# Patient Record
Sex: MALE | NOT HISPANIC OR LATINO | Employment: FULL TIME | URBAN - METROPOLITAN AREA
[De-identification: names, ages, dates, MRNs, and addresses within clinical notes are randomized per-mention and may not be internally consistent; named-entity substitution may affect disease eponyms.]

---

## 2023-04-06 PROBLEM — G47.00 INSOMNIA: Status: ACTIVE | Noted: 2023-04-06

## 2023-04-06 PROBLEM — R03.0 ELEVATED BLOOD PRESSURE READING: Status: ACTIVE | Noted: 2023-04-06

## 2023-04-06 PROBLEM — F41.9 ANXIETY: Status: ACTIVE | Noted: 2023-04-06

## 2023-04-06 PROBLEM — R42 LIGHT HEADEDNESS: Status: ACTIVE | Noted: 2023-04-06

## 2023-04-06 PROBLEM — R00.2 PALPITATIONS: Status: ACTIVE | Noted: 2023-04-06

## 2023-04-06 RX ORDER — ESCITALOPRAM OXALATE 10 MG/1
1 TABLET ORAL DAILY
COMMUNITY
Start: 2021-03-15 | End: 2023-04-07 | Stop reason: SDUPTHER

## 2023-04-07 ENCOUNTER — OFFICE VISIT (OUTPATIENT)
Dept: PRIMARY CARE | Facility: CLINIC | Age: 28
End: 2023-04-07
Payer: COMMERCIAL

## 2023-04-07 VITALS
SYSTOLIC BLOOD PRESSURE: 130 MMHG | HEART RATE: 81 BPM | WEIGHT: 305 LBS | BODY MASS INDEX: 45.18 KG/M2 | HEIGHT: 69 IN | DIASTOLIC BLOOD PRESSURE: 80 MMHG

## 2023-04-07 DIAGNOSIS — F41.9 ANXIETY: Primary | ICD-10-CM

## 2023-04-07 PROCEDURE — 99213 OFFICE O/P EST LOW 20 MIN: CPT | Performed by: STUDENT IN AN ORGANIZED HEALTH CARE EDUCATION/TRAINING PROGRAM

## 2023-04-07 PROCEDURE — 1036F TOBACCO NON-USER: CPT | Performed by: STUDENT IN AN ORGANIZED HEALTH CARE EDUCATION/TRAINING PROGRAM

## 2023-04-07 RX ORDER — ESCITALOPRAM OXALATE 10 MG/1
10 TABLET ORAL DAILY
Qty: 90 TABLET | Refills: 1 | Status: SHIPPED | OUTPATIENT
Start: 2023-04-07 | End: 2023-10-12

## 2023-04-07 ASSESSMENT — PATIENT HEALTH QUESTIONNAIRE - PHQ9
1. LITTLE INTEREST OR PLEASURE IN DOING THINGS: NOT AT ALL
SUM OF ALL RESPONSES TO PHQ9 QUESTIONS 1 AND 2: 0
2. FEELING DOWN, DEPRESSED OR HOPELESS: NOT AT ALL

## 2023-04-07 NOTE — PROGRESS NOTES
"Subjective   Patient ID: Neptali Gómez is a 28 y.o. male who presents for Med Refill.  Today he is accompanied by alone.     HPI  Neptali is presenting to the office today to follow-up for his anxiety    States that he continues his Lexapro 10 mg daily as indicated the chart    Denies any highs/lows or any major panic attacks    States that he has been following up with a therapist now for the past 3 months and is doing much better  Continues to feel well with this medication and does not wish to change his dosage  Requesting refills to be sent to his mail out pharmacy    Current Outpatient Medications on File Prior to Visit   Medication Sig Dispense Refill    [DISCONTINUED] escitalopram (Lexapro) 10 mg tablet Take 1 tablet (10 mg) by mouth once daily.       No current facility-administered medications on file prior to visit.        No Known Allergies    Immunization History   Administered Date(s) Administered    DTaP, Unspecified 1996, 1996, 1996, 09/21/1997, 03/29/2001    Hep B, Adolescent or Pediatric 1996, 1996, 1996    Hib (PRP-T) 1996, 1996, 1996, 05/29/1997    Influenza, seasonal, injectable 11/07/2022    MMR 05/29/1997, 03/29/2001    Meningococcal MCV4P 11/14/2008, 07/24/2014    OPV 1996, 1996, 09/21/1997, 03/29/2001    Pfizer Purple Cap SARS-CoV-2 04/05/2021, 01/10/2022    Tdap 08/09/2007, 09/02/2020    Varicella 09/26/1997, 08/09/2007         Review of Systems  All pertinent positive symptoms are included in the history of present illness.  All other systems have been reviewed and are negative and noncontributory to this patient's current ailments.     Objective   /80 (BP Location: Left arm, Patient Position: Sitting, BP Cuff Size: Large adult)   Pulse 81   Ht 1.74 m (5' 8.5\")   Wt 138 kg (305 lb)   BMI 45.70 kg/m²   BSA: 2.58 meters squared  No visits with results within 1 Month(s) from this visit.   Latest known visit with " results is:   Legacy Encounter on 11/07/2022   Component Date Value Ref Range Status    Hemoglobin A1C 11/07/2022 5.7 (A)  % Final    Comment:      Diagnosis of Diabetes-Adults   Non-Diabetic: < or = 5.6%   Increased risk for developing diabetes: 5.7-6.4%   Diagnostic of diabetes: > or = 6.5%  .       Monitoring of Diabetes                Age (y)     Therapeutic Goal (%)   Adults:          >18           <7.0   Pediatrics:    13-18           <7.5                   7-12           <8.0                   0- 6            7.5-8.5   American Diabetes Association. Diabetes Care 33(S1), Jan 2010.      Estimated Average Glucose 11/07/2022 117  MG/DL Final    Glucose 11/07/2022 87  74 - 99 mg/dL Final    Sodium 11/07/2022 137  136 - 145 mmol/L Final    Potassium 11/07/2022 4.4  3.5 - 5.3 mmol/L Final    Chloride 11/07/2022 105  98 - 107 mmol/L Final    Bicarbonate 11/07/2022 26  21 - 32 mmol/L Final    Anion Gap 11/07/2022 10  10 - 20 mmol/L Final    Urea Nitrogen 11/07/2022 12  6 - 23 mg/dL Final    Creatinine 11/07/2022 0.74  0.50 - 1.30 mg/dL Final    GFR MALE 11/07/2022 >90  >90 mL/min/1.73m2 Final    Comment:  CALCULATIONS OF ESTIMATED GFR ARE PERFORMED   USING THE 2021 CKD-EPI STUDY REFIT EQUATION   WITHOUT THE RACE VARIABLE FOR THE IDMS-TRACEABLE   CREATININE METHODS.    https://jasn.asnjournals.org/content/early/2021/09/22/ASN.6449633842      Calcium 11/07/2022 9.4  8.6 - 10.3 mg/dL Final    Albumin 11/07/2022 4.4  3.4 - 5.0 g/dL Final    Alkaline Phosphatase 11/07/2022 82  33 - 120 U/L Final    Total Protein 11/07/2022 7.1  6.4 - 8.2 g/dL Final    AST 11/07/2022 18  9 - 39 U/L Final    Total Bilirubin 11/07/2022 0.5  0.0 - 1.2 mg/dL Final    ALT (SGPT) 11/07/2022 23  10 - 52 U/L Final    Comment:  Patients treated with Sulfasalazine may generate    falsely decreased results for ALT.      TSH 11/07/2022 2.77  0.44 - 3.98 mIU/L Final    Comment:  TSH testing is performed using different testing    methodology at  [facility] than at other    [facility]. Direct result comparisons should    only be made within the same method.      Cholesterol 11/07/2022 142  0 - 199 mg/dL Final    Comment: .      AGE      DESIRABLE   BORDERLINE HIGH   HIGH     0-19 Y     0 - 169       170 - 199     >/= 200    20-24 Y     0 - 189       190 - 224     >/= 225         >24 Y     0 - 199       200 - 239     >/= 240   **All ranges are based on fasting samples. Specific   therapeutic targets will vary based on patient-specific   cardiac risk.  .   Pediatric guidelines reference:Pediatrics 2011, 128(S5).   Adult guidelines reference: NCEP ATPIII Guidelines,     ASHISH 2001, 258:2486-97  .   Venipuncture immediately after or during the    administration of Metamizole may lead to falsely   low results. Testing should be performed immediately   prior to Metamizole dosing.      HDL 11/07/2022 53.2  mg/dL Final    Comment: .      AGE      VERY LOW   LOW     NORMAL    HIGH       0-19 Y       < 35   < 40     40-45     ----    20-24 Y       ----   < 40       >45     ----      >24 Y       ----   < 40     40-60      >60  .      Cholesterol/HDL Ratio 11/07/2022 2.7   Final    Comment: REF VALUES  DESIRABLE  < 3.4  HIGH RISK  > 5.0      LDL 11/07/2022 78  0 - 99 mg/dL Final    Comment: .                           NEAR      BORD      AGE      DESIRABLE  OPTIMAL    HIGH     HIGH     VERY HIGH     0-19 Y     0 - 109     ---    110-129   >/= 130     ----    20-24 Y     0 - 119     ---    120-159   >/= 160     ----      >24 Y     0 -  99   100-129  130-159   160-189     >/=190  .      VLDL 11/07/2022 11  0 - 40 mg/dL Final    Triglycerides 11/07/2022 53  0 - 149 mg/dL Final    Comment: .      AGE      DESIRABLE   BORDERLINE HIGH   HIGH     VERY HIGH   0 D-90 D    19 - 174         ----         ----        ----  91 D- 9 Y     0 -  74        75 -  99     >/= 100      ----    10-19 Y     0 -  89        90 - 129     >/= 130      ----    20-24 Y     0 - 114        115 - 149     >/= 150      ----         >24 Y     0 - 149       150 - 199    200- 499    >/= 500  .   Venipuncture immediately after or during the    administration of Metamizole may lead to falsely   low results. Testing should be performed immediately   prior to Metamizole dosing.      WBC 11/07/2022 7.1  4.4 - 11.3 x10E9/L Final    RBC 11/07/2022 5.19  4.50 - 5.90 x10E12/L Final    Hemoglobin 11/07/2022 14.2  13.5 - 17.5 g/dL Final    Hematocrit 11/07/2022 45.1  41.0 - 52.0 % Final    MCV 11/07/2022 87  80 - 100 fL Final    MCHC 11/07/2022 31.5 (L)  32.0 - 36.0 g/dL Final    Platelets 11/07/2022 296  150 - 450 x10E9/L Final    RDW 11/07/2022 13.3  11.5 - 14.5 % Final       Physical Exam  CONSTITUTIONAL - well nourished, well developed, looks like stated age, in no acute distress, not ill-appearing, and not tired appearing  SKIN - normal skin color and pigmentation, normal skin turgor without rash, lesions, or nodules visualized  HEAD - no trauma, normocephalic  EYES - normal external exam  CHEST -no distressed breathing, good effort  EXTREMITIES - no edema, no deformities  NEUROLOGICAL - normal balance, normal motor, no ataxia  PSYCHIATRIC - alert, pleasant and cordial, age-appropriate    Assessment/Plan   Stable.  No changes recommended this time    Continue Lexapro 10 mg daily as currently prescribed    This was sent out to your mail out pharmacy    We will discuss this further in 6 months at your physical examination    Please contact the office if you have any further questions/concerns

## 2023-04-10 ENCOUNTER — TELEMEDICINE (OUTPATIENT)
Dept: PRIMARY CARE | Facility: CLINIC | Age: 28
End: 2023-04-10
Payer: COMMERCIAL

## 2023-04-10 DIAGNOSIS — J06.9 ACUTE URI: ICD-10-CM

## 2023-04-10 DIAGNOSIS — J01.40 ACUTE NON-RECURRENT PANSINUSITIS: Primary | ICD-10-CM

## 2023-04-10 PROBLEM — J32.9 SINUSITIS: Status: ACTIVE | Noted: 2023-04-10

## 2023-04-10 PROCEDURE — 99214 OFFICE O/P EST MOD 30 MIN: CPT | Performed by: STUDENT IN AN ORGANIZED HEALTH CARE EDUCATION/TRAINING PROGRAM

## 2023-04-10 RX ORDER — AMOXICILLIN AND CLAVULANATE POTASSIUM 875; 125 MG/1; MG/1
875 TABLET, FILM COATED ORAL 2 TIMES DAILY
Qty: 20 TABLET | Refills: 0 | Status: SHIPPED | OUTPATIENT
Start: 2023-04-10 | End: 2023-04-20

## 2023-04-10 RX ORDER — FLUTICASONE PROPIONATE 50 MCG
1 SPRAY, SUSPENSION (ML) NASAL 2 TIMES DAILY
Qty: 16 G | Refills: 1 | Status: SHIPPED | OUTPATIENT
Start: 2023-04-10

## 2023-04-10 RX ORDER — AMOXICILLIN AND CLAVULANATE POTASSIUM 875; 125 MG/1; MG/1
875 TABLET, FILM COATED ORAL 2 TIMES DAILY
Qty: 20 TABLET | Refills: 0 | Status: SHIPPED | OUTPATIENT
Start: 2023-04-10 | End: 2023-04-10 | Stop reason: SDUPTHER

## 2023-04-10 RX ORDER — FLUTICASONE PROPIONATE 50 MCG
1 SPRAY, SUSPENSION (ML) NASAL 2 TIMES DAILY
Qty: 16 G | Refills: 1 | Status: SHIPPED | OUTPATIENT
Start: 2023-04-10 | End: 2023-04-10 | Stop reason: SDUPTHER

## 2023-04-10 RX ORDER — AZELASTINE 1 MG/ML
1 SPRAY, METERED NASAL 2 TIMES DAILY
Qty: 30 ML | Refills: 2 | Status: SHIPPED | OUTPATIENT
Start: 2023-04-10 | End: 2023-04-10 | Stop reason: SDUPTHER

## 2023-04-10 RX ORDER — AZELASTINE 1 MG/ML
1 SPRAY, METERED NASAL 2 TIMES DAILY
Qty: 30 ML | Refills: 2 | Status: SHIPPED | OUTPATIENT
Start: 2023-04-10

## 2023-04-10 NOTE — PROGRESS NOTES
Subjective   Patient ID: Neptali Gómez is a 28 y.o. male who presents for Earache and Nasal Congestion.  Today he is accompanied by alone.     HPI    Acute pansinusitis/acute URI  States that symptoms began a week ago  Endorses cough, congestion, sore throat  Denies any fevers, chills, SOB, chest pain/pressure  Has tried OTC products without relief  Requesting for additional interventions at this time    Current Outpatient Medications on File Prior to Visit   Medication Sig Dispense Refill    escitalopram (Lexapro) 10 mg tablet Take 1 tablet (10 mg) by mouth once daily. 90 tablet 1    [DISCONTINUED] escitalopram (Lexapro) 10 mg tablet Take 1 tablet (10 mg) by mouth once daily.       No current facility-administered medications on file prior to visit.        No Known Allergies    Immunization History   Administered Date(s) Administered    DTaP, Unspecified 1996, 1996, 1996, 09/21/1997, 03/29/2001    Hep B, Adolescent or Pediatric 1996, 1996, 1996    Hib (PRP-T) 1996, 1996, 1996, 05/29/1997    Influenza, seasonal, injectable 11/07/2022    MMR 05/29/1997, 03/29/2001    Meningococcal MCV4P 11/14/2008, 07/24/2014    OPV 1996, 1996, 09/21/1997, 03/29/2001    Pfizer Purple Cap SARS-CoV-2 04/05/2021, 01/10/2022    Tdap 08/09/2007, 09/02/2020    Varicella 09/26/1997, 08/09/2007       Review of Systems  All pertinent positive symptoms are included in the history of present illness.    All other systems have been reviewed and are negative and noncontributory to this patient's current ailments.     Objective   There were no vitals taken for this visit.  BSA: There is no height or weight on file to calculate BSA.  Growth percentiles: Facility age limit for growth %jalen is 20 years. Facility age limit for growth %jalen is 20 years.   No visits with results within 1 Month(s) from this visit.   Latest known visit with results is:   Legacy Encounter on 11/07/2022    Component Date Value Ref Range Status    Hemoglobin A1C 11/07/2022 5.7 (A)  % Final    Comment:      Diagnosis of Diabetes-Adults   Non-Diabetic: < or = 5.6%   Increased risk for developing diabetes: 5.7-6.4%   Diagnostic of diabetes: > or = 6.5%  .       Monitoring of Diabetes                Age (y)     Therapeutic Goal (%)   Adults:          >18           <7.0   Pediatrics:    13-18           <7.5                   7-12           <8.0                   0- 6            7.5-8.5   American Diabetes Association. Diabetes Care 33(S1), Jan 2010.      Estimated Average Glucose 11/07/2022 117  MG/DL Final    Glucose 11/07/2022 87  74 - 99 mg/dL Final    Sodium 11/07/2022 137  136 - 145 mmol/L Final    Potassium 11/07/2022 4.4  3.5 - 5.3 mmol/L Final    Chloride 11/07/2022 105  98 - 107 mmol/L Final    Bicarbonate 11/07/2022 26  21 - 32 mmol/L Final    Anion Gap 11/07/2022 10  10 - 20 mmol/L Final    Urea Nitrogen 11/07/2022 12  6 - 23 mg/dL Final    Creatinine 11/07/2022 0.74  0.50 - 1.30 mg/dL Final    GFR MALE 11/07/2022 >90  >90 mL/min/1.73m2 Final    Comment:  CALCULATIONS OF ESTIMATED GFR ARE PERFORMED   USING THE 2021 CKD-EPI STUDY REFIT EQUATION   WITHOUT THE RACE VARIABLE FOR THE IDMS-TRACEABLE   CREATININE METHODS.    https://jasn.asnjournals.org/content/early/2021/09/22/ASN.5092711279      Calcium 11/07/2022 9.4  8.6 - 10.3 mg/dL Final    Albumin 11/07/2022 4.4  3.4 - 5.0 g/dL Final    Alkaline Phosphatase 11/07/2022 82  33 - 120 U/L Final    Total Protein 11/07/2022 7.1  6.4 - 8.2 g/dL Final    AST 11/07/2022 18  9 - 39 U/L Final    Total Bilirubin 11/07/2022 0.5  0.0 - 1.2 mg/dL Final    ALT (SGPT) 11/07/2022 23  10 - 52 U/L Final    Comment:  Patients treated with Sulfasalazine may generate    falsely decreased results for ALT.      TSH 11/07/2022 2.77  0.44 - 3.98 mIU/L Final    Comment:  TSH testing is performed using different testing    methodology at [facility] than at other    system  [facility]. Direct result comparisons should    only be made within the same method.      Cholesterol 11/07/2022 142  0 - 199 mg/dL Final    Comment: .      AGE      DESIRABLE   BORDERLINE HIGH   HIGH     0-19 Y     0 - 169       170 - 199     >/= 200    20-24 Y     0 - 189       190 - 224     >/= 225         >24 Y     0 - 199       200 - 239     >/= 240   **All ranges are based on fasting samples. Specific   therapeutic targets will vary based on patient-specific   cardiac risk.  .   Pediatric guidelines reference:Pediatrics 2011, 128(S5).   Adult guidelines reference: NCEP ATPIII Guidelines,     ASHISH 2001, 258:2486-97  .   Venipuncture immediately after or during the    administration of Metamizole may lead to falsely   low results. Testing should be performed immediately   prior to Metamizole dosing.      HDL 11/07/2022 53.2  mg/dL Final    Comment: .      AGE      VERY LOW   LOW     NORMAL    HIGH       0-19 Y       < 35   < 40     40-45     ----    20-24 Y       ----   < 40       >45     ----      >24 Y       ----   < 40     40-60      >60  .      Cholesterol/HDL Ratio 11/07/2022 2.7   Final    Comment: REF VALUES  DESIRABLE  < 3.4  HIGH RISK  > 5.0      LDL 11/07/2022 78  0 - 99 mg/dL Final    Comment: .                           NEAR      BORD      AGE      DESIRABLE  OPTIMAL    HIGH     HIGH     VERY HIGH     0-19 Y     0 - 109     ---    110-129   >/= 130     ----    20-24 Y     0 - 119     ---    120-159   >/= 160     ----      >24 Y     0 -  99   100-129  130-159   160-189     >/=190  .      VLDL 11/07/2022 11  0 - 40 mg/dL Final    Triglycerides 11/07/2022 53  0 - 149 mg/dL Final    Comment: .      AGE      DESIRABLE   BORDERLINE HIGH   HIGH     VERY HIGH   0 D-90 D    19 - 174         ----         ----        ----  91 D- 9 Y     0 -  74        75 -  99     >/= 100      ----    10-19 Y     0 -  89        90 - 129     >/= 130      ----    20-24 Y     0 - 114       115 - 149     >/= 150      ----          >24 Y     0 - 149       150 - 199    200- 499    >/= 500  .   Venipuncture immediately after or during the    administration of Metamizole may lead to falsely   low results. Testing should be performed immediately   prior to Metamizole dosing.      WBC 11/07/2022 7.1  4.4 - 11.3 x10E9/L Final    RBC 11/07/2022 5.19  4.50 - 5.90 x10E12/L Final    Hemoglobin 11/07/2022 14.2  13.5 - 17.5 g/dL Final    Hematocrit 11/07/2022 45.1  41.0 - 52.0 % Final    MCV 11/07/2022 87  80 - 100 fL Final    MCHC 11/07/2022 31.5 (L)  32.0 - 36.0 g/dL Final    Platelets 11/07/2022 296  150 - 450 x10E9/L Final    RDW 11/07/2022 13.3  11.5 - 14.5 % Final       Physical Exam   GENERAL: alert and appropriate, in no distress, well-hydrated, well-nourished and happy, smiling, interactive   SKIN: no rash noted   HEAD: normocephalic, no abnormality or lesion noted   EYES: no injection and visual acuity is grossly normal   EARS: external ears normal   NOSE: external nose normal without rhinorrhea   RESPIRATORY: breathing non-labored and no grunting/flaring/retractions   CHEST: equal chest rise with normal respiratory effort   NEUROLOGIC: no obvious deficit    Assessment/Plan   Problem List Items Addressed This Visit          Infectious/Inflammatory    Sinusitis - Primary    Acute URI     Prescription for antibiotics - Augmentin has been sent to your pharmacy  Take this twice daily for 10 days   Prescription for Flonase and Azelastine has been sent to your pharmacy for nasal congestion  If you notice no changes after completing your antibiotics please let us know and we can send for alternative medication  We will notify of test results once available and make treatment recommendations accordingly  As discussed, start Tylenol 1000 mg every 8 hours alternating with ibuprofen 600 mg every 8 hours    If you have worsening fevers without relief from Tylenol, chest pains, or SOB during rest would recommend going to the emergency room at that time

## 2023-04-10 NOTE — ASSESSMENT & PLAN NOTE
Prescription for antibiotics - Augmentin has been sent to your pharmacy  Take this twice daily for 10 days   Prescription for Flonase and Azelastine has been sent to your pharmacy for nasal congestion  If you notice no changes after completing your antibiotics please let us know and we can send for alternative medication  We will notify of test results once available and make treatment recommendations accordingly  As discussed, start Tylenol 1000 mg every 8 hours alternating with ibuprofen 600 mg every 8 hours    If you have worsening fevers without relief from Tylenol, chest pains, or SOB during rest would recommend going to the emergency room at that time

## 2023-04-11 ENCOUNTER — APPOINTMENT (OUTPATIENT)
Dept: PRIMARY CARE | Facility: CLINIC | Age: 28
End: 2023-04-11
Payer: COMMERCIAL

## 2023-10-09 DIAGNOSIS — F41.9 ANXIETY: ICD-10-CM

## 2023-10-12 RX ORDER — ESCITALOPRAM OXALATE 10 MG/1
10 TABLET ORAL DAILY
Qty: 90 TABLET | Refills: 1 | Status: SHIPPED | OUTPATIENT
Start: 2023-10-12 | End: 2023-10-19 | Stop reason: SDUPTHER

## 2023-10-19 ENCOUNTER — OFFICE VISIT (OUTPATIENT)
Dept: PRIMARY CARE | Facility: CLINIC | Age: 28
End: 2023-10-19
Payer: COMMERCIAL

## 2023-10-19 VITALS
HEIGHT: 69 IN | HEART RATE: 73 BPM | DIASTOLIC BLOOD PRESSURE: 80 MMHG | BODY MASS INDEX: 45.03 KG/M2 | WEIGHT: 304 LBS | SYSTOLIC BLOOD PRESSURE: 130 MMHG

## 2023-10-19 DIAGNOSIS — Z23 INFLUENZA VACCINE NEEDED: ICD-10-CM

## 2023-10-19 DIAGNOSIS — Z00.00 HEALTHCARE MAINTENANCE: Primary | ICD-10-CM

## 2023-10-19 DIAGNOSIS — F41.9 ANXIETY: ICD-10-CM

## 2023-10-19 PROCEDURE — 1036F TOBACCO NON-USER: CPT | Performed by: STUDENT IN AN ORGANIZED HEALTH CARE EDUCATION/TRAINING PROGRAM

## 2023-10-19 PROCEDURE — 90471 IMMUNIZATION ADMIN: CPT | Performed by: STUDENT IN AN ORGANIZED HEALTH CARE EDUCATION/TRAINING PROGRAM

## 2023-10-19 PROCEDURE — 99395 PREV VISIT EST AGE 18-39: CPT | Performed by: STUDENT IN AN ORGANIZED HEALTH CARE EDUCATION/TRAINING PROGRAM

## 2023-10-19 PROCEDURE — 90686 IIV4 VACC NO PRSV 0.5 ML IM: CPT | Performed by: STUDENT IN AN ORGANIZED HEALTH CARE EDUCATION/TRAINING PROGRAM

## 2023-10-19 RX ORDER — TRAZODONE HYDROCHLORIDE 50 MG/1
TABLET ORAL
COMMUNITY

## 2023-10-19 RX ORDER — ESCITALOPRAM OXALATE 10 MG/1
10 TABLET ORAL DAILY
Qty: 90 TABLET | Refills: 1 | Status: SHIPPED | OUTPATIENT
Start: 2023-10-19 | End: 2024-04-26 | Stop reason: SDUPTHER

## 2023-10-19 ASSESSMENT — PROMIS GLOBAL HEALTH SCALE
RATE_SOCIAL_SATISFACTION: EXCELLENT
RATE_PHYSICAL_HEALTH: GOOD
RATE_MENTAL_HEALTH: EXCELLENT
RATE_QUALITY_OF_LIFE: VERY GOOD
CARRYOUT_SOCIAL_ACTIVITIES: VERY GOOD
RATE_GENERAL_HEALTH: GOOD
RATE_AVERAGE_PAIN: 0
EMOTIONAL_PROBLEMS: NEVER
CARRYOUT_PHYSICAL_ACTIVITIES: COMPLETELY

## 2023-10-19 ASSESSMENT — PATIENT HEALTH QUESTIONNAIRE - PHQ9
SUM OF ALL RESPONSES TO PHQ9 QUESTIONS 1 AND 2: 0
2. FEELING DOWN, DEPRESSED OR HOPELESS: NOT AT ALL
1. LITTLE INTEREST OR PLEASURE IN DOING THINGS: NOT AT ALL

## 2023-10-19 NOTE — PROGRESS NOTES
Subjective   Patient ID: Neptali Gómez is a 28 y.o. male who presents for Annual Exam.  Today he is accompanied by alone.     HPI  Neptali is presenting to the office today to follow-up for his anxiety  Health Maintenance  Overall patient is doing well.   Immunization: Tdap 2020  Influenza vaccine 2022   Colon Cancer Screening: No family history  Diet: working on it   Exercise: could be better  Tobacco: Denies use  EtOH: Rarely Socially      2. Anxiety  States that he continues his Lexapro 10 mg daily as indicated the chart  Denies any highs/lows or any major panic attacks  Continues to feel well with this medication and does not wish to change his dosage  Requesting refills to be sent to his mail out pharmacy    3. Need for flu vaccination  Agreeable for vaccination  Denies any side effects previous vaccination        Current Outpatient Medications on File Prior to Visit   Medication Sig Dispense Refill    azelastine (Astelin) 137 mcg (0.1 %) nasal spray Administer 1 spray into each nostril in the morning and 1 spray before bedtime. 30 mL 2    escitalopram (Lexapro) 10 mg tablet TAKE 1 TABLET DAILY 90 tablet 1    fluticasone (Flonase) 50 mcg/actuation nasal spray Administer 1 spray into each nostril in the morning and 1 spray before bedtime. 16 g 1    traZODone (Desyrel) 50 mg tablet Take by mouth.       No current facility-administered medications on file prior to visit.        No Known Allergies    Immunization History   Administered Date(s) Administered    DTaP, Unspecified 1996, 1996, 1996, 09/21/1997, 03/29/2001    Hepatitis B vaccine, pediatric/adolescent (RECOMBIVAX, ENGERIX) 1996, 1996, 1996    HiB PRP-T conjugate vaccine (HIBERIX, ACTHIB) 1996, 1996, 1996, 05/29/1997    Influenza, seasonal, injectable 11/07/2022    MMR vaccine, subcutaneous (MMR II) 05/29/1997, 03/29/2001    Meningococcal MCV4P 11/14/2008, 07/24/2014    OPV 1996, 1996,  "09/21/1997, 03/29/2001    Pfizer Purple Cap SARS-CoV-2 04/05/2021, 01/10/2022    Tdap vaccine, age 7 year and older (BOOSTRIX) 08/09/2007, 09/02/2020    Varicella vaccine, subcutaneous (VARIVAX) 09/26/1997, 08/09/2007         Review of Systems  All pertinent positive symptoms are included in the history of present illness.  All other systems have been reviewed and are negative and noncontributory to this patient's current ailments.     Objective   /80 (BP Location: Left arm, Patient Position: Sitting, BP Cuff Size: Large adult)   Pulse 73   Ht 1.753 m (5' 9\")   Wt 138 kg (304 lb)   BMI 44.89 kg/m²   BSA: 2.59 meters squared  No visits with results within 1 Month(s) from this visit.   Latest known visit with results is:   Legacy Encounter on 11/07/2022   Component Date Value Ref Range Status    Hemoglobin A1C 11/07/2022 5.7 (A)  % Final    Comment:      Diagnosis of Diabetes-Adults   Non-Diabetic: < or = 5.6%   Increased risk for developing diabetes: 5.7-6.4%   Diagnostic of diabetes: > or = 6.5%  .       Monitoring of Diabetes                Age (y)     Therapeutic Goal (%)   Adults:          >18           <7.0   Pediatrics:    13-18           <7.5                   7-12           <8.0                   0- 6            7.5-8.5   American Diabetes Association. Diabetes Care 33(S1), Jan 2010.      Estimated Average Glucose 11/07/2022 117  MG/DL Final    Glucose 11/07/2022 87  74 - 99 mg/dL Final    Sodium 11/07/2022 137  136 - 145 mmol/L Final    Potassium 11/07/2022 4.4  3.5 - 5.3 mmol/L Final    Chloride 11/07/2022 105  98 - 107 mmol/L Final    Bicarbonate 11/07/2022 26  21 - 32 mmol/L Final    Anion Gap 11/07/2022 10  10 - 20 mmol/L Final    Urea Nitrogen 11/07/2022 12  6 - 23 mg/dL Final    Creatinine 11/07/2022 0.74  0.50 - 1.30 mg/dL Final    GFR MALE 11/07/2022 >90  >90 mL/min/1.73m2 Final    Comment:  CALCULATIONS OF ESTIMATED GFR ARE PERFORMED   USING THE 2021 CKD-EPI STUDY REFIT EQUATION   WITHOUT " THE RACE VARIABLE FOR THE IDMS-TRACEABLE   CREATININE METHODS.    https://jasn.asnjournals.org/content/early/2021/09/22/ASN.0157864678      Calcium 11/07/2022 9.4  8.6 - 10.3 mg/dL Final    Albumin 11/07/2022 4.4  3.4 - 5.0 g/dL Final    Alkaline Phosphatase 11/07/2022 82  33 - 120 U/L Final    Total Protein 11/07/2022 7.1  6.4 - 8.2 g/dL Final    AST 11/07/2022 18  9 - 39 U/L Final    Total Bilirubin 11/07/2022 0.5  0.0 - 1.2 mg/dL Final    ALT (SGPT) 11/07/2022 23  10 - 52 U/L Final    Comment:  Patients treated with Sulfasalazine may generate    falsely decreased results for ALT.      TSH 11/07/2022 2.77  0.44 - 3.98 mIU/L Final    Comment:  TSH testing is performed using different testing    methodology at [facility] ([facility]) than at other    [facility]. Direct result comparisons should    only be made within the same method.      Cholesterol 11/07/2022 142  0 - 199 mg/dL Final    Comment: .      AGE      DESIRABLE   BORDERLINE HIGH   HIGH     0-19 Y     0 - 169       170 - 199     >/= 200    20-24 Y     0 - 189       190 - 224     >/= 225         >24 Y     0 - 199       200 - 239     >/= 240   **All ranges are based on fasting samples. Specific   therapeutic targets will vary based on patient-specific   cardiac risk.  .   Pediatric guidelines reference:Pediatrics 2011, 128(S5).   Adult guidelines reference: NCEP ATPIII Guidelines,     ASHISH 2001, 258:2486-97  .   Venipuncture immediately after or during the    administration of Metamizole may lead to falsely   low results. Testing should be performed immediately   prior to Metamizole dosing.      HDL 11/07/2022 53.2  mg/dL Final    Comment: .      AGE      VERY LOW   LOW     NORMAL    HIGH       0-19 Y       < 35   < 40     40-45     ----    20-24 Y       ----   < 40       >45     ----      >24 Y       ----   < 40     40-60      >60  .      Cholesterol/HDL Ratio 11/07/2022 2.7   Final    Comment: REF VALUES  DESIRABLE  < 3.4  HIGH RISK  > 5.0      LDL  11/07/2022 78  0 - 99 mg/dL Final    Comment: .                           NEAR      BORD      AGE      DESIRABLE  OPTIMAL    HIGH     HIGH     VERY HIGH     0-19 Y     0 - 109     ---    110-129   >/= 130     ----    20-24 Y     0 - 119     ---    120-159   >/= 160     ----      >24 Y     0 -  99   100-129  130-159   160-189     >/=190  .      VLDL 11/07/2022 11  0 - 40 mg/dL Final    Triglycerides 11/07/2022 53  0 - 149 mg/dL Final    Comment: .      AGE      DESIRABLE   BORDERLINE HIGH   HIGH     VERY HIGH   0 D-90 D    19 - 174         ----         ----        ----  91 D- 9 Y     0 -  74        75 -  99     >/= 100      ----    10-19 Y     0 -  89        90 - 129     >/= 130      ----    20-24 Y     0 - 114       115 - 149     >/= 150      ----         >24 Y     0 - 149       150 - 199    200- 499    >/= 500  .   Venipuncture immediately after or during the    administration of Metamizole may lead to falsely   low results. Testing should be performed immediately   prior to Metamizole dosing.      WBC 11/07/2022 7.1  4.4 - 11.3 x10E9/L Final    RBC 11/07/2022 5.19  4.50 - 5.90 x10E12/L Final    Hemoglobin 11/07/2022 14.2  13.5 - 17.5 g/dL Final    Hematocrit 11/07/2022 45.1  41.0 - 52.0 % Final    MCV 11/07/2022 87  80 - 100 fL Final    MCHC 11/07/2022 31.5 (L)  32.0 - 36.0 g/dL Final    Platelets 11/07/2022 296  150 - 450 x10E9/L Final    RDW 11/07/2022 13.3  11.5 - 14.5 % Final       Physical Exam  CONSTITUTIONAL - well nourished, well developed, looks like stated age, in no acute distress, not ill-appearing, and not tired appearing  SKIN - normal skin color and pigmentation, normal skin turgor without rash, lesions, or nodules visualized  HEAD - no trauma, normocephalic  EYES - normal external exam  ENT - TM's intact, no injection, no signs of infection, uvula midline, normal tongue movement and throat normal, no exudate, nasal passage without discharge and patent  NECK - supple without rigidity, no neck mass  was observed  CHEST - clear to auscultation, no wheezing, no crackles and no rales, good effort  CARDIAC - regular rate and regular rhythm, no skipped beats, no murmur  ABDOMEN - no organomegaly, soft, nontender, nondistended, normal bowel sounds, no guarding/rebound/rigidity  EXTREMITIES - no edema, no deformities  NEUROLOGICAL - normal gait, normal balance, normal motor, no ataxia, alert, oriented and no focal signs  PSYCHIATRIC - alert, pleasant and cordial, age-appropriate  IMMUNOLOGIC - no cervical lymphadenopathy     Assessment/Plan     Health Maintenance  Complete history and physical examination was performed  Lab work ordered to be fasting due to your history of prediabetes  We will notify of test results once available and make treatment recommendations accordingly  Influenza vaccination was provided to you today    2.  Anxiety  Stable.  No changes recommended this time  Continue Lexapro 10 mg daily as currently prescribed  This was sent out to your mail out pharmacy    3. Need for vaccination  All questions were answered and you were counseled on immunization in detail and vaccinations were provided     Please contact the office if you have any further questions/concerns  Otherwise, please follow-up in 6 months for continued care

## 2023-10-30 ENCOUNTER — APPOINTMENT (OUTPATIENT)
Dept: PRIMARY CARE | Facility: CLINIC | Age: 28
End: 2023-10-30
Payer: COMMERCIAL

## 2024-01-24 ENCOUNTER — OFFICE VISIT (OUTPATIENT)
Dept: PRIMARY CARE | Facility: CLINIC | Age: 29
End: 2024-01-24
Payer: COMMERCIAL

## 2024-01-24 ENCOUNTER — HOSPITAL ENCOUNTER (OUTPATIENT)
Dept: RADIOLOGY | Facility: CLINIC | Age: 29
Discharge: HOME | End: 2024-01-24
Payer: COMMERCIAL

## 2024-01-24 VITALS
DIASTOLIC BLOOD PRESSURE: 80 MMHG | BODY MASS INDEX: 44.89 KG/M2 | HEART RATE: 82 BPM | SYSTOLIC BLOOD PRESSURE: 128 MMHG | WEIGHT: 304 LBS

## 2024-01-24 DIAGNOSIS — M25.572 ACUTE LEFT ANKLE PAIN: Primary | ICD-10-CM

## 2024-01-24 DIAGNOSIS — S93.422A SPRAIN OF DELTOID LIGAMENT OF LEFT ANKLE, INITIAL ENCOUNTER: ICD-10-CM

## 2024-01-24 DIAGNOSIS — M25.572 ACUTE LEFT ANKLE PAIN: ICD-10-CM

## 2024-01-24 PROCEDURE — 73610 X-RAY EXAM OF ANKLE: CPT | Mod: LT

## 2024-01-24 PROCEDURE — 73610 X-RAY EXAM OF ANKLE: CPT | Mod: LEFT SIDE | Performed by: RADIOLOGY

## 2024-01-24 PROCEDURE — 99213 OFFICE O/P EST LOW 20 MIN: CPT | Performed by: FAMILY MEDICINE

## 2024-01-24 PROCEDURE — 1036F TOBACCO NON-USER: CPT | Performed by: FAMILY MEDICINE

## 2024-01-24 NOTE — ASSESSMENT & PLAN NOTE
Please complete x-ray of left ankle at your earliest convenience  Based on your exam, I suspect that this is likely secondary to a sprain as opposed to a fracture  We will notify of test results once available

## 2024-01-24 NOTE — PROGRESS NOTES
"Subjective   Patient ID: Neptali Gómez is a 28 y.o. male who presents for Foot Swelling (Fell on ice left ankle and foot pain).    Past Medical, Surgical, and Family History reviewed and updated in chart.    Reviewed all medications by prescribing practitioner or clinical pharmacist (such as prescriptions, OTCs, herbal therapies and supplements) and documented in the medical record.    HPI  Neptali presents for evaluation of left ankle pain and swelling after an inversion injury that occurred yesterday morning. He states he was walking his dog outside when the leash got caught around a pole, and he slipped on black ice. He fell, with his legs going opposite ways, and landed with his left ankle everted.     Denies hearing a pop or crack during this, and he was able to stand up and walk inside without assistance. Patient denies hitting head, losing consciousness, or bleeding. Since the fall, he has tried icing, elevation, and Aleve with minimal relief. He frequently must shift his weight and \"hobble\" in order to ambulate without pain. He was able to sleep through the night without waking secondary to pain.     His ankle is significantly more swollen today, however he reports that pain is the same as yesterday. Patient does not believe his ankle is broken, though he is interested in getting an ankle x-ray to rule out any acute fracture.    Review of Systems  All pertinent positive symptoms are included in the history of present illness.    All other systems have been reviewed and are negative and noncontributory to this patient's current ailments.    History reviewed. No pertinent past medical history.  Past Surgical History:   Procedure Laterality Date    OTHER SURGICAL HISTORY  09/02/2020    No history of surgery     Social History     Tobacco Use    Smoking status: Never    Smokeless tobacco: Never     Family History   Problem Relation Name Age of Onset    Anxiety disorder Mother      Atrial fibrillation Brother  "      Immunization History   Administered Date(s) Administered    DTaP, Unspecified 1996, 1996, 1996, 09/21/1997, 03/29/2001    Flu vaccine (IIV4), preservative free *Check age/dose* 10/19/2023    Hepatitis B vaccine, pediatric/adolescent (RECOMBIVAX, ENGERIX) 1996, 1996, 1996    HiB PRP-T conjugate vaccine (HIBERIX, ACTHIB) 1996, 1996, 1996, 05/29/1997    Influenza, seasonal, injectable 11/07/2022    MMR vaccine, subcutaneous (MMR II) 05/29/1997, 03/29/2001    Meningococcal MCV4P 11/14/2008, 07/24/2014    OPV 1996, 1996, 09/21/1997, 03/29/2001    Pfizer Purple Cap SARS-CoV-2 04/05/2021, 01/10/2022    Tdap vaccine, age 7 year and older (BOOSTRIX) 08/09/2007, 09/02/2020    Varicella vaccine, subcutaneous (VARIVAX) 09/26/1997, 08/09/2007     Current Outpatient Medications   Medication Instructions    azelastine (Astelin) 137 mcg (0.1 %) nasal spray 1 spray, Each Nostril, 2 times daily    escitalopram (LEXAPRO) 10 mg, oral, Daily    fluticasone (Flonase) 50 mcg/actuation nasal spray 1 spray, Each Nostril, 2 times daily    traZODone (Desyrel) 50 mg tablet oral     No Known Allergies    Objective   Vitals:    01/24/24 1105   BP: 128/80   Pulse: 82   Weight: 138 kg (304 lb)     Body mass index is 44.89 kg/m².    BP Readings from Last 3 Encounters:   01/24/24 128/80   10/19/23 130/80   04/07/23 130/80      Wt Readings from Last 3 Encounters:   01/24/24 138 kg (304 lb)   10/19/23 138 kg (304 lb)   04/07/23 138 kg (305 lb)        No visits with results within 1 Month(s) from this visit.   Latest known visit with results is:   Legacy Encounter on 11/07/2022   Component Date Value    Hemoglobin A1C 11/07/2022 5.7 (A)     Estimated Average Glucose 11/07/2022 117     Glucose 11/07/2022 87     Sodium 11/07/2022 137     Potassium 11/07/2022 4.4     Chloride 11/07/2022 105     Bicarbonate 11/07/2022 26     Anion Gap 11/07/2022 10     Urea Nitrogen 11/07/2022 12      Creatinine 11/07/2022 0.74     GFR MALE 11/07/2022 >90     Calcium 11/07/2022 9.4     Albumin 11/07/2022 4.4     Alkaline Phosphatase 11/07/2022 82     Total Protein 11/07/2022 7.1     AST 11/07/2022 18     Total Bilirubin 11/07/2022 0.5     ALT (SGPT) 11/07/2022 23     TSH 11/07/2022 2.77     Cholesterol 11/07/2022 142     HDL 11/07/2022 53.2     Cholesterol/HDL Ratio 11/07/2022 2.7     LDL 11/07/2022 78     VLDL 11/07/2022 11     Triglycerides 11/07/2022 53     WBC 11/07/2022 7.1     RBC 11/07/2022 5.19     Hemoglobin 11/07/2022 14.2     Hematocrit 11/07/2022 45.1     MCV 11/07/2022 87     MCHC 11/07/2022 31.5 (L)     Platelets 11/07/2022 296     RDW 11/07/2022 13.3      Physical Exam  CONSTITUTIONAL - well nourished, well developed, looks like stated age, in no acute distress, not ill-appearing, and not tired appearing  SKIN - normal skin color and pigmentation, normal skin turgor without rash, lesions, or nodules visualized  HEAD - no trauma, normocephalic  NECK - supple without rigidity, no neck mass was observed, no thyromegaly or thyroid nodules  MUSCULOSKELETAL - left ankle: significant edema, no obvious deformity or ecchymoses; reduced range of motion with pain in medial and lateral deviation, all other ROM is intact.  Mild tenderness to the distal tibia, but no fibular tenderness, no metatarsal tenderness, especially at the base of the fifth metatarsal; no reproducible tenderness in the proximal tibia or fibula or around the knee  NEUROLOGICAL - normal gait, normal balance, normal motor, no ataxia, alert, oriented and no focal signs  PSYCHIATRIC - alert, pleasant and cordial, age-appropriate    Assessment/Plan   Problem List Items Addressed This Visit       Acute left ankle pain - Primary     Please complete x-ray of left ankle at your earliest convenience  Based on your exam, I suspect that this is likely secondary to a sprain as opposed to a fracture  We will notify of test results once available          Relevant Orders    XR ankle left 3+ views    Sprain of deltoid ligament of left ankle     Rest the area of concern as much as possible  NSAIDs for pain and inflammation  Ice suggested to be used 10 to 15 minutes at least 2 to 3 times daily  Compression of the area with the immobilizer/brace as instructed in the office  Elevation throughout the day can be beneficial as well to help decrease the swelling and subsequent pain  If there is no improvement in overall symptoms over the next 7 to 10 days, then further evaluation is warranted  We may have to consider another x-ray in 7-10 days after the injury if the pain continues to persist

## 2024-01-24 NOTE — ASSESSMENT & PLAN NOTE
Rest the area of concern as much as possible  NSAIDs for pain and inflammation  Ice suggested to be used 10 to 15 minutes at least 2 to 3 times daily  Compression of the area with the immobilizer/brace as instructed in the office  Elevation throughout the day can be beneficial as well to help decrease the swelling and subsequent pain  If there is no improvement in overall symptoms over the next 7 to 10 days, then further evaluation is warranted  We may have to consider another x-ray in 7-10 days after the injury if the pain continues to persist

## 2024-01-25 NOTE — RESULT ENCOUNTER NOTE
No evidence of any fracture noted by radiology  I would recommend that ankle lace up brace that we discussed in the office, ice, rest, and anti-inflammatories  If the pain continues to persist with the swelling, please let me know, but I suspect this should be healed in about 4 to 6 weeks

## 2024-04-26 ENCOUNTER — OFFICE VISIT (OUTPATIENT)
Dept: PRIMARY CARE | Facility: CLINIC | Age: 29
End: 2024-04-26
Payer: COMMERCIAL

## 2024-04-26 ENCOUNTER — LAB (OUTPATIENT)
Dept: LAB | Facility: LAB | Age: 29
End: 2024-04-26
Payer: COMMERCIAL

## 2024-04-26 VITALS
DIASTOLIC BLOOD PRESSURE: 80 MMHG | WEIGHT: 307 LBS | SYSTOLIC BLOOD PRESSURE: 130 MMHG | HEART RATE: 85 BPM | BODY MASS INDEX: 45.34 KG/M2

## 2024-04-26 DIAGNOSIS — F41.9 ANXIETY: ICD-10-CM

## 2024-04-26 DIAGNOSIS — Z00.00 HEALTHCARE MAINTENANCE: ICD-10-CM

## 2024-04-26 LAB
ALBUMIN SERPL BCP-MCNC: 4.4 G/DL (ref 3.4–5)
ALP SERPL-CCNC: 76 U/L (ref 33–120)
ALT SERPL W P-5'-P-CCNC: 24 U/L (ref 10–52)
ANION GAP SERPL CALC-SCNC: 14 MMOL/L (ref 10–20)
AST SERPL W P-5'-P-CCNC: 22 U/L (ref 9–39)
BASOPHILS # BLD AUTO: 0.02 X10*3/UL (ref 0–0.1)
BASOPHILS NFR BLD AUTO: 0.3 %
BILIRUB SERPL-MCNC: 0.5 MG/DL (ref 0–1.2)
BUN SERPL-MCNC: 12 MG/DL (ref 6–23)
CALCIUM SERPL-MCNC: 9.4 MG/DL (ref 8.6–10.3)
CHLORIDE SERPL-SCNC: 105 MMOL/L (ref 98–107)
CHOLEST SERPL-MCNC: 132 MG/DL (ref 0–199)
CHOLESTEROL/HDL RATIO: 2.3
CO2 SERPL-SCNC: 22 MMOL/L (ref 21–32)
CREAT SERPL-MCNC: 0.8 MG/DL (ref 0.5–1.3)
EGFRCR SERPLBLD CKD-EPI 2021: >90 ML/MIN/1.73M*2
EOSINOPHIL # BLD AUTO: 0.08 X10*3/UL (ref 0–0.7)
EOSINOPHIL NFR BLD AUTO: 1.1 %
ERYTHROCYTE [DISTWIDTH] IN BLOOD BY AUTOMATED COUNT: 13.2 % (ref 11.5–14.5)
EST. AVERAGE GLUCOSE BLD GHB EST-MCNC: 114 MG/DL
GLUCOSE SERPL-MCNC: 93 MG/DL (ref 74–99)
HBA1C MFR BLD: 5.6 %
HCT VFR BLD AUTO: 45.1 % (ref 41–52)
HDLC SERPL-MCNC: 56.2 MG/DL
HGB BLD-MCNC: 13.8 G/DL (ref 13.5–17.5)
IMM GRANULOCYTES # BLD AUTO: 0.03 X10*3/UL (ref 0–0.7)
IMM GRANULOCYTES NFR BLD AUTO: 0.4 % (ref 0–0.9)
LDLC SERPL CALC-MCNC: 67 MG/DL
LYMPHOCYTES # BLD AUTO: 1.79 X10*3/UL (ref 1.2–4.8)
LYMPHOCYTES NFR BLD AUTO: 24.7 %
MCH RBC QN AUTO: 27.1 PG (ref 26–34)
MCHC RBC AUTO-ENTMCNC: 30.6 G/DL (ref 32–36)
MCV RBC AUTO: 88 FL (ref 80–100)
MONOCYTES # BLD AUTO: 0.55 X10*3/UL (ref 0.1–1)
MONOCYTES NFR BLD AUTO: 7.6 %
NEUTROPHILS # BLD AUTO: 4.79 X10*3/UL (ref 1.2–7.7)
NEUTROPHILS NFR BLD AUTO: 65.9 %
NON HDL CHOLESTEROL: 76 MG/DL (ref 0–149)
NRBC BLD-RTO: 0 /100 WBCS (ref 0–0)
PLATELET # BLD AUTO: 270 X10*3/UL (ref 150–450)
POTASSIUM SERPL-SCNC: 4.8 MMOL/L (ref 3.5–5.3)
PROT SERPL-MCNC: 7 G/DL (ref 6.4–8.2)
RBC # BLD AUTO: 5.1 X10*6/UL (ref 4.5–5.9)
SODIUM SERPL-SCNC: 136 MMOL/L (ref 136–145)
TRIGL SERPL-MCNC: 43 MG/DL (ref 0–149)
TSH SERPL-ACNC: 0.97 MIU/L (ref 0.44–3.98)
VLDL: 9 MG/DL (ref 0–40)
WBC # BLD AUTO: 7.3 X10*3/UL (ref 4.4–11.3)

## 2024-04-26 PROCEDURE — 80061 LIPID PANEL: CPT

## 2024-04-26 PROCEDURE — 80053 COMPREHEN METABOLIC PANEL: CPT

## 2024-04-26 PROCEDURE — 85025 COMPLETE CBC W/AUTO DIFF WBC: CPT

## 2024-04-26 PROCEDURE — 36415 COLL VENOUS BLD VENIPUNCTURE: CPT

## 2024-04-26 PROCEDURE — 99213 OFFICE O/P EST LOW 20 MIN: CPT | Performed by: STUDENT IN AN ORGANIZED HEALTH CARE EDUCATION/TRAINING PROGRAM

## 2024-04-26 PROCEDURE — 83036 HEMOGLOBIN GLYCOSYLATED A1C: CPT

## 2024-04-26 PROCEDURE — 84443 ASSAY THYROID STIM HORMONE: CPT

## 2024-04-26 RX ORDER — ESCITALOPRAM OXALATE 10 MG/1
10 TABLET ORAL DAILY
Qty: 90 TABLET | Refills: 1 | Status: SHIPPED | OUTPATIENT
Start: 2024-04-26

## 2024-04-26 NOTE — PROGRESS NOTES
Subjective   Patient ID: Neptali Gómez is a 29 y.o. male who presents for medication follow-up  Today he is accompanied by alone.     HPI  Neptali is presenting to the office today to follow-up for his anxiety    1. Anxiety  Continues Lexapro 10 mg daily as indicated the chart  Denies high/lows or any HI/SI  Denies any major panic attacks  Wishes to continue the medication at its current dosage as he feels well  Requesting to be sent out to his mail out pharmacy          Current Outpatient Medications on File Prior to Visit   Medication Sig Dispense Refill    azelastine (Astelin) 137 mcg (0.1 %) nasal spray Administer 1 spray into each nostril in the morning and 1 spray before bedtime. 30 mL 2    fluticasone (Flonase) 50 mcg/actuation nasal spray Administer 1 spray into each nostril in the morning and 1 spray before bedtime. 16 g 1    traZODone (Desyrel) 50 mg tablet Take by mouth.      [DISCONTINUED] escitalopram (Lexapro) 10 mg tablet Take 1 tablet (10 mg) by mouth once daily. 90 tablet 1     No current facility-administered medications on file prior to visit.        No Known Allergies    Immunization History   Administered Date(s) Administered    DTaP, Unspecified 1996, 1996, 1996, 09/21/1997, 03/29/2001    Flu vaccine (IIV4), preservative free *Check age/dose* 10/05/2020, 10/29/2021, 11/07/2022, 10/19/2023    Hepatitis B vaccine, pediatric/adolescent (RECOMBIVAX, ENGERIX) 1996, 1996, 1996    HiB PRP-T conjugate vaccine (HIBERIX, ACTHIB) 1996, 1996, 1996, 05/29/1997    Influenza, seasonal, injectable 11/07/2022    MMR vaccine, subcutaneous (MMR II) 05/29/1997, 03/29/2001    Meningococcal MCV4P 11/14/2008, 07/24/2014    OPV 1996, 1996, 09/21/1997, 03/29/2001    Pfizer Purple Cap SARS-CoV-2 04/05/2021, 01/10/2022    Tdap vaccine, age 7 year and older (BOOSTRIX, ADACEL) 08/09/2007, 09/02/2020    Varicella vaccine, subcutaneous (VARIVAX) 09/26/1997,  08/09/2007         Review of Systems  All pertinent positive symptoms are included in the history of present illness.  All other systems have been reviewed and are negative and noncontributory to this patient's current ailments.     Objective   /80   Pulse 85   Wt 139 kg (307 lb)   BMI 45.34 kg/m²   BSA: 2.6 meters squared  No visits with results within 1 Month(s) from this visit.   Latest known visit with results is:   [facility] Encounter on 11/07/2022   Component Date Value Ref Range Status    Hemoglobin A1C 11/07/2022 5.7 (A)  % Final    Comment:      Diagnosis of Diabetes-Adults   Non-Diabetic: < or = 5.6%   Increased risk for developing diabetes: 5.7-6.4%   Diagnostic of diabetes: > or = 6.5%  .       Monitoring of Diabetes                Age (y)     Therapeutic Goal (%)   Adults:          >18           <7.0   Pediatrics:    13-18           <7.5                   7-12           <8.0                   0- 6            7.5-8.5   American Diabetes Association. Diabetes Care 33(S1), Jan 2010.      Estimated Average Glucose 11/07/2022 117  MG/DL Final    Glucose 11/07/2022 87  74 - 99 mg/dL Final    Sodium 11/07/2022 137  136 - 145 mmol/L Final    Potassium 11/07/2022 4.4  3.5 - 5.3 mmol/L Final    Chloride 11/07/2022 105  98 - 107 mmol/L Final    Bicarbonate 11/07/2022 26  21 - 32 mmol/L Final    Anion Gap 11/07/2022 10  10 - 20 mmol/L Final    Urea Nitrogen 11/07/2022 12  6 - 23 mg/dL Final    Creatinine 11/07/2022 0.74  0.50 - 1.30 mg/dL Final    GFR MALE 11/07/2022 >90  >90 mL/min/1.73m2 Final    Comment:  CALCULATIONS OF ESTIMATED GFR ARE PERFORMED   USING THE 2021 CKD-EPI STUDY REFIT EQUATION   WITHOUT THE RACE VARIABLE FOR THE IDMS-TRACEABLE   CREATININE METHODS.    https://jasn.asnjournals.org/content/early/2021/09/22/ASN.9851685628      Calcium 11/07/2022 9.4  8.6 - 10.3 mg/dL Final    Albumin 11/07/2022 4.4  3.4 - 5.0 g/dL Final    Alkaline Phosphatase 11/07/2022 82  33 - 120 U/L Final    Total  Protein 11/07/2022 7.1  6.4 - 8.2 g/dL Final    AST 11/07/2022 18  9 - 39 U/L Final    Total Bilirubin 11/07/2022 0.5  0.0 - 1.2 mg/dL Final    ALT (SGPT) 11/07/2022 23  10 - 52 U/L Final    Comment:  Patients treated with Sulfasalazine may generate    falsely decreased results for ALT.      TSH 11/07/2022 2.77  0.44 - 3.98 mIU/L Final    Comment:  TSH testing is performed using different testing    methodology at [facility] than at other    [facility]. Direct result comparisons should    only be made within the same method.      Cholesterol 11/07/2022 142  0 - 199 mg/dL Final    Comment: .      AGE      DESIRABLE   BORDERLINE HIGH   HIGH     0-19 Y     0 - 169       170 - 199     >/= 200    20-24 Y     0 - 189       190 - 224     >/= 225         >24 Y     0 - 199       200 - 239     >/= 240   **All ranges are based on fasting samples. Specific   therapeutic targets will vary based on patient-specific   cardiac risk.  .   Pediatric guidelines reference:Pediatrics 2011, 128(S5).   Adult guidelines reference: NCEP ATPIII Guidelines,     ASHISH 2001, 258:2486-97  .   Venipuncture immediately after or during the    administration of Metamizole may lead to falsely   low results. Testing should be performed immediately   prior to Metamizole dosing.      HDL 11/07/2022 53.2  mg/dL Final    Comment: .      AGE      VERY LOW   LOW     NORMAL    HIGH       0-19 Y       < 35   < 40     40-45     ----    20-24 Y       ----   < 40       >45     ----      >24 Y       ----   < 40     40-60      >60  .      Cholesterol/HDL Ratio 11/07/2022 2.7   Final    Comment: REF VALUES  DESIRABLE  < 3.4  HIGH RISK  > 5.0      LDL 11/07/2022 78  0 - 99 mg/dL Final    Comment: .                           NEAR      BORD      AGE      DESIRABLE  OPTIMAL    HIGH     HIGH     VERY HIGH     0-19 Y     0 - 109     ---    110-129   >/= 130     ----    20-24 Y     0 - 119     ---    120-159   >/= 160     ----      >24 Y     0 -  99    100-129  130-159   160-189     >/=190  .      VLDL 11/07/2022 11  0 - 40 mg/dL Final    Triglycerides 11/07/2022 53  0 - 149 mg/dL Final    Comment: .      AGE      DESIRABLE   BORDERLINE HIGH   HIGH     VERY HIGH   0 D-90 D    19 - 174         ----         ----        ----  91 D- 9 Y     0 -  74        75 -  99     >/= 100      ----    10-19 Y     0 -  89        90 - 129     >/= 130      ----    20-24 Y     0 - 114       115 - 149     >/= 150      ----         >24 Y     0 - 149       150 - 199    200- 499    >/= 500  .   Venipuncture immediately after or during the    administration of Metamizole may lead to falsely   low results. Testing should be performed immediately   prior to Metamizole dosing.      WBC 11/07/2022 7.1  4.4 - 11.3 x10E9/L Final    RBC 11/07/2022 5.19  4.50 - 5.90 x10E12/L Final    Hemoglobin 11/07/2022 14.2  13.5 - 17.5 g/dL Final    Hematocrit 11/07/2022 45.1  41.0 - 52.0 % Final    MCV 11/07/2022 87  80 - 100 fL Final    MCHC 11/07/2022 31.5 (L)  32.0 - 36.0 g/dL Final    Platelets 11/07/2022 296  150 - 450 x10E9/L Final    RDW 11/07/2022 13.3  11.5 - 14.5 % Final       Physical Exam  CONSTITUTIONAL - well nourished, well developed, looks like stated age, in no acute distress, not ill-appearing, and not tired appearing  SKIN - normal skin color and pigmentation, normal skin turgor without rash, lesions, or nodules visualized  HEAD - no trauma, normocephalic  EYES - normal external exam  CHEST -no distressed breathing, good effort  EXTREMITIES - no edema, no deformities  NEUROLOGICAL - normal balance, normal motor, no ataxia  PSYCHIATRIC - alert, pleasant and cordial, age-appropriate    Assessment/Plan     1. Anxiety  Stable.  No changes recommended this time  Continue Lexapro 10 mg daily as currently prescribed  This was sent out to your mail out pharmacy       Please contact the office if you have any further questions/concerns  Otherwise, please follow-up in 6 months for continued care as  well as your yearly physical examination

## 2024-04-28 NOTE — RESULT ENCOUNTER NOTE
Complete blood cell count within normal limits, no anemia    Hemoglobin A1c within normal limits at 5.6%    Thyroid well within normal limits    Cholesterol looks great at 132, HDL 56, LDL 67, triglycerides 43    Sugar, kidneys, liver, electrolytes are all within normal limits

## 2024-12-13 ENCOUNTER — APPOINTMENT (OUTPATIENT)
Dept: PRIMARY CARE | Facility: CLINIC | Age: 29
End: 2024-12-13
Payer: COMMERCIAL

## 2024-12-13 VITALS
HEIGHT: 69 IN | BODY MASS INDEX: 44.88 KG/M2 | OXYGEN SATURATION: 98 % | HEART RATE: 107 BPM | SYSTOLIC BLOOD PRESSURE: 126 MMHG | DIASTOLIC BLOOD PRESSURE: 80 MMHG | WEIGHT: 303 LBS

## 2024-12-13 DIAGNOSIS — Z00.00 HEALTHCARE MAINTENANCE: Primary | ICD-10-CM

## 2024-12-13 DIAGNOSIS — F41.9 ANXIETY: ICD-10-CM

## 2024-12-13 DIAGNOSIS — Z11.3 SCREEN FOR STD (SEXUALLY TRANSMITTED DISEASE): ICD-10-CM

## 2024-12-13 DIAGNOSIS — Z23 FLU VACCINE NEED: ICD-10-CM

## 2024-12-13 LAB
POC APPEARANCE, URINE: CLEAR
POC BILIRUBIN, URINE: NEGATIVE
POC BLOOD, URINE: NEGATIVE
POC COLOR, URINE: YELLOW
POC GLUCOSE, URINE: NEGATIVE MG/DL
POC KETONES, URINE: NEGATIVE MG/DL
POC LEUKOCYTES, URINE: NEGATIVE
POC NITRITE,URINE: NEGATIVE
POC PH, URINE: 6 PH
POC PROTEIN, URINE: NEGATIVE MG/DL
POC SPECIFIC GRAVITY, URINE: 1.01
POC UROBILINOGEN, URINE: 0.2 EU/DL

## 2024-12-13 PROCEDURE — 87491 CHLMYD TRACH DNA AMP PROBE: CPT

## 2024-12-13 PROCEDURE — 87591 N.GONORRHOEAE DNA AMP PROB: CPT

## 2024-12-13 PROCEDURE — 99395 PREV VISIT EST AGE 18-39: CPT | Performed by: STUDENT IN AN ORGANIZED HEALTH CARE EDUCATION/TRAINING PROGRAM

## 2024-12-13 PROCEDURE — 90471 IMMUNIZATION ADMIN: CPT | Performed by: STUDENT IN AN ORGANIZED HEALTH CARE EDUCATION/TRAINING PROGRAM

## 2024-12-13 PROCEDURE — 81002 URINALYSIS NONAUTO W/O SCOPE: CPT | Performed by: STUDENT IN AN ORGANIZED HEALTH CARE EDUCATION/TRAINING PROGRAM

## 2024-12-13 PROCEDURE — 1036F TOBACCO NON-USER: CPT | Performed by: STUDENT IN AN ORGANIZED HEALTH CARE EDUCATION/TRAINING PROGRAM

## 2024-12-13 PROCEDURE — 3008F BODY MASS INDEX DOCD: CPT | Performed by: STUDENT IN AN ORGANIZED HEALTH CARE EDUCATION/TRAINING PROGRAM

## 2024-12-13 PROCEDURE — 90656 IIV3 VACC NO PRSV 0.5 ML IM: CPT | Performed by: STUDENT IN AN ORGANIZED HEALTH CARE EDUCATION/TRAINING PROGRAM

## 2024-12-13 RX ORDER — ESCITALOPRAM OXALATE 10 MG/1
10 TABLET ORAL DAILY
Qty: 90 TABLET | Refills: 1 | Status: SHIPPED | OUTPATIENT
Start: 2024-12-13

## 2024-12-13 ASSESSMENT — PATIENT HEALTH QUESTIONNAIRE - PHQ9
2. FEELING DOWN, DEPRESSED OR HOPELESS: NOT AT ALL
1. LITTLE INTEREST OR PLEASURE IN DOING THINGS: NOT AT ALL
SUM OF ALL RESPONSES TO PHQ9 QUESTIONS 1 AND 2: 0

## 2024-12-13 NOTE — PROGRESS NOTES
Subjective   Patient ID: Neptali Gómez is a 29 y.o. male who presents for Annual Exam.  Today he is accompanied by alone.     HPI    Health Maintenance  Overall patient is doing well.   Immunization: Tdap 2020,  Influenza vaccine willing to update today  Colon Cancer Screening: No family history  Diet: working on it   Exercise: could be better  Tobacco: Denies use  EtOH: Rarely / Socially    Illicit drug; denies    2. Anxiety  States that he continues his Lexapro 10 mg daily as indicated the chart  Denies any highs/lows or any major panic attacks.  No signs of depression, HI or SI.   Continues to feel well with this medication and does not wish to change his dosage.  Requesting refills to be sent to his mail out pharmacy          Current Outpatient Medications on File Prior to Visit   Medication Sig Dispense Refill    [DISCONTINUED] azelastine (Astelin) 137 mcg (0.1 %) nasal spray Administer 1 spray into each nostril in the morning and 1 spray before bedtime. 30 mL 2    [DISCONTINUED] escitalopram (Lexapro) 10 mg tablet Take 1 tablet (10 mg) by mouth once daily. 90 tablet 1    [DISCONTINUED] fluticasone (Flonase) 50 mcg/actuation nasal spray Administer 1 spray into each nostril in the morning and 1 spray before bedtime. 16 g 1    [DISCONTINUED] traZODone (Desyrel) 50 mg tablet Take by mouth.       No current facility-administered medications on file prior to visit.        No Known Allergies    Immunization History   Administered Date(s) Administered    DTaP, Unspecified 1996, 1996, 1996, 09/21/1997, 03/29/2001    Flu vaccine (IIV4), preservative free *Check age/dose* 10/05/2020, 10/29/2021, 11/07/2022, 10/19/2023    Hepatitis B vaccine, 19 yrs and under (RECOMBIVAX, ENGERIX) 1996, 1996, 1996    HiB PRP-T conjugate vaccine (HIBERIX, ACTHIB) 1996, 1996, 1996, 05/29/1997    Influenza, seasonal, injectable 11/07/2022    MMR vaccine, subcutaneous (MMR II)  "05/29/1997, 03/29/2001    Meningococcal ACWY-D (Menactra) 4-valent conjugate vaccine 11/14/2008, 07/24/2014    OPV 1996, 1996, 09/21/1997, 03/29/2001    Pfizer Purple Cap SARS-CoV-2 04/05/2021, 01/10/2022    Tdap vaccine, age 7 year and older (BOOSTRIX, ADACEL) 08/09/2007, 09/02/2020    Varicella vaccine, subcutaneous (VARIVAX) 09/26/1997, 08/09/2007         Review of Systems  All pertinent positive symptoms are included in the history of present illness.  All other systems have been reviewed and are negative and noncontributory to this patient's current ailments.     Objective   /80 (BP Location: Left arm, Patient Position: Sitting, BP Cuff Size: Large adult)   Pulse 107   Ht 1.753 m (5' 9\")   Wt 137 kg (303 lb)   SpO2 98%   BMI 44.75 kg/m²   BSA: 2.58 meters squared  No visits with results within 1 Month(s) from this visit.   Latest known visit with results is:   Lab on 04/26/2024   Component Date Value Ref Range Status    Thyroid Stimulating Hormone 04/26/2024 0.97  0.44 - 3.98 mIU/L Final    Cholesterol 04/26/2024 132  0 - 199 mg/dL Final          Age      Desirable   Borderline High   High     0-19 Y     0 - 169       170 - 199     >/= 200    20-24 Y     0 - 189       190 - 224     >/= 225         >24 Y     0 - 199       200 - 239     >/= 240   **All ranges are based on fasting samples. Specific   therapeutic targets will vary based on patient-specific   cardiac risk.    Pediatric guidelines reference:Pediatrics 2011, 128(S5).Adult guidelines reference: NCEP ATPIII Guidelines,ASIHSH 2001, 258:2486-97    Venipuncture immediately after or during the administration of Metamizole may lead to falsely low results. Testing should be performed immediately prior to Metamizole dosing.    HDL-Cholesterol 04/26/2024 56.2  mg/dL Final      Age       Very Low   Low     Normal    High    0-19 Y    < 35      < 40     40-45     ----  20-24 Y    ----     < 40      >45      ----        >24 Y      ----     < " 40     40-60      >60      Cholesterol/HDL Ratio 04/26/2024 2.3   Final      Ref Values  Desirable  < 3.4  High Risk  > 5.0    LDL Calculated 04/26/2024 67  <=99 mg/dL Final                                Near   Borderline      AGE      Desirable  Optimal    High     High     Very High     0-19 Y     0 - 109     ---    110-129   >/= 130     ----    20-24 Y     0 - 119     ---    120-159   >/= 160     ----      >24 Y     0 -  99   100-129  130-159   160-189     >/=190      VLDL 04/26/2024 9  0 - 40 mg/dL Final    Triglycerides 04/26/2024 43  0 - 149 mg/dL Final       Age         Desirable   Borderline High   High     Very High   0 D-90 D    19 - 174         ----         ----        ----  91 D- 9 Y     0 -  74        75 -  99     >/= 100      ----    10-19 Y     0 -  89        90 - 129     >/= 130      ----    20-24 Y     0 - 114       115 - 149     >/= 150      ----         >24 Y     0 - 149       150 - 199    200- 499    >/= 500    Venipuncture immediately after or during the administration of Metamizole may lead to falsely low results. Testing should be performed immediately prior to Metamizole dosing.    Non HDL Cholesterol 04/26/2024 76  0 - 149 mg/dL Final          Age       Desirable   Borderline High   High     Very High     0-19 Y     0 - 119       120 - 144     >/= 145    >/= 160    20-24 Y     0 - 149       150 - 189     >/= 190      ----         >24 Y    30 mg/dL above LDL Cholesterol goal      Glucose 04/26/2024 93  74 - 99 mg/dL Final    Sodium 04/26/2024 136  136 - 145 mmol/L Final    Potassium 04/26/2024 4.8  3.5 - 5.3 mmol/L Final    Chloride 04/26/2024 105  98 - 107 mmol/L Final    Bicarbonate 04/26/2024 22  21 - 32 mmol/L Final    Anion Gap 04/26/2024 14  10 - 20 mmol/L Final    Urea Nitrogen 04/26/2024 12  6 - 23 mg/dL Final    Creatinine 04/26/2024 0.80  0.50 - 1.30 mg/dL Final    eGFR 04/26/2024 >90  >60 mL/min/1.73m*2 Final    Calculations of estimated GFR are performed using the 2021 CKD-EPI  Study Refit equation without the race variable for the IDMS-Traceable creatinine methods.  https://jasn.asnjournals.org/content/early/2021/09/22/ASN.5512232294    Calcium 04/26/2024 9.4  8.6 - 10.3 mg/dL Final    Albumin 04/26/2024 4.4  3.4 - 5.0 g/dL Final    Alkaline Phosphatase 04/26/2024 76  33 - 120 U/L Final    Total Protein 04/26/2024 7.0  6.4 - 8.2 g/dL Final    AST 04/26/2024 22  9 - 39 U/L Final    Bilirubin, Total 04/26/2024 0.5  0.0 - 1.2 mg/dL Final    ALT 04/26/2024 24  10 - 52 U/L Final    Patients treated with Sulfasalazine may generate falsely decreased results for ALT.    WBC 04/26/2024 7.3  4.4 - 11.3 x10*3/uL Final    nRBC 04/26/2024 0.0  0.0 - 0.0 /100 WBCs Final    RBC 04/26/2024 5.10  4.50 - 5.90 x10*6/uL Final    Hemoglobin 04/26/2024 13.8  13.5 - 17.5 g/dL Final    Hematocrit 04/26/2024 45.1  41.0 - 52.0 % Final    MCV 04/26/2024 88  80 - 100 fL Final    MCH 04/26/2024 27.1  26.0 - 34.0 pg Final    MCHC 04/26/2024 30.6 (L)  32.0 - 36.0 g/dL Final    RDW 04/26/2024 13.2  11.5 - 14.5 % Final    Platelets 04/26/2024 270  150 - 450 x10*3/uL Final    Neutrophils % 04/26/2024 65.9  40.0 - 80.0 % Final    Immature Granulocytes %, Automated 04/26/2024 0.4  0.0 - 0.9 % Final    Immature Granulocyte Count (IG) includes promyelocytes, myelocytes and metamyelocytes but does not include bands. Percent differential counts (%) should be interpreted in the context of the absolute cell counts (cells/UL).    Lymphocytes % 04/26/2024 24.7  13.0 - 44.0 % Final    Monocytes % 04/26/2024 7.6  2.0 - 10.0 % Final    Eosinophils % 04/26/2024 1.1  0.0 - 6.0 % Final    Basophils % 04/26/2024 0.3  0.0 - 2.0 % Final    Neutrophils Absolute 04/26/2024 4.79  1.20 - 7.70 x10*3/uL Final    Percent differential counts (%) should be interpreted in the context of the absolute cell counts (cells/uL).    Immature Granulocytes Absolute, Au* 04/26/2024 0.03  0.00 - 0.70 x10*3/uL Final    Lymphocytes Absolute 04/26/2024 1.79  1.20  - 4.80 x10*3/uL Final    Monocytes Absolute 04/26/2024 0.55  0.10 - 1.00 x10*3/uL Final    Eosinophils Absolute 04/26/2024 0.08  0.00 - 0.70 x10*3/uL Final    Basophils Absolute 04/26/2024 0.02  0.00 - 0.10 x10*3/uL Final    Hemoglobin A1C 04/26/2024 5.6  see below % Final    Estimated Average Glucose 04/26/2024 114  Not Established mg/dL Final       Physical Exam  CONSTITUTIONAL - well nourished, well developed, looks like stated age, in no acute distress, not ill-appearing, and not tired appearing, overweight  SKIN - normal skin color and pigmentation, normal skin turgor without rash, lesions, or nodules visualized  HEAD - no trauma, normocephalic  EYES - normal external exam  ENT - TM's intact, no injection, no signs of infection, uvula midline, normal tongue movement and throat normal, no exudate, nasal passage without discharge and patent  NECK - supple without rigidity, no neck mass was observed  CHEST - clear to auscultation, no wheezing, no crackles and no rales, good effort  CARDIAC - regular rate and regular rhythm, no skipped beats, no murmur  ABDOMEN - no organomegaly, soft, nontender, nondistended, normal bowel sounds, no guarding/rebound/rigidity  EXTREMITIES - no edema, no deformities  NEUROLOGICAL - normal gait, normal balance, normal motor, no ataxia, alert, oriented and no focal signs  PSYCHIATRIC - alert, pleasant and cordial, age-appropriate  IMMUNOLOGIC - no cervical lymphadenopathy     Assessment/Plan     Health Maintenance  Complete history and physical examination was performed  Lab work ordered to be fasting due to your history of prediabetes, he had a A1c of 5.7 last year.   We will notify of test results once available and make treatment recommendations accordingly  Influenza vaccination was provided to you today    2.  Anxiety  Stable.  No changes recommended this time  Continue Lexapro 10 mg daily as currently prescribed  This was sent out to your mail out pharmacy    3. Need for  vaccination  -Flu vaccine was done today.   All questions were answered and you were counseled on immunization in detail and vaccinations were provided     Please contact the office if you have any further questions/concerns  Otherwise, please follow-up in 6 months for continued care     Jana Nunez. MD  Family medicine resident  [id number]

## 2024-12-14 LAB
C TRACH RRNA SPEC QL NAA+PROBE: NEGATIVE
N GONORRHOEA DNA SPEC QL PROBE+SIG AMP: NEGATIVE

## 2024-12-15 NOTE — RESULT ENCOUNTER NOTE
Gonorrhea/chlamydia negative    Please get all the other lab work at your earliest mutual convenience

## 2025-06-12 ENCOUNTER — OFFICE VISIT (OUTPATIENT)
Dept: PRIMARY CARE | Facility: CLINIC | Age: 30
End: 2025-06-12
Payer: COMMERCIAL

## 2025-06-12 VITALS
BODY MASS INDEX: 45.34 KG/M2 | HEART RATE: 99 BPM | OXYGEN SATURATION: 97 % | SYSTOLIC BLOOD PRESSURE: 124 MMHG | WEIGHT: 307 LBS | DIASTOLIC BLOOD PRESSURE: 78 MMHG

## 2025-06-12 DIAGNOSIS — F41.9 ANXIETY: Primary | ICD-10-CM

## 2025-06-12 DIAGNOSIS — M43.6 ACUTE MUSCLE STIFFNESS OF NECK: ICD-10-CM

## 2025-06-12 PROCEDURE — 99214 OFFICE O/P EST MOD 30 MIN: CPT | Performed by: STUDENT IN AN ORGANIZED HEALTH CARE EDUCATION/TRAINING PROGRAM

## 2025-06-12 PROCEDURE — 1036F TOBACCO NON-USER: CPT | Performed by: STUDENT IN AN ORGANIZED HEALTH CARE EDUCATION/TRAINING PROGRAM

## 2025-06-12 RX ORDER — METHYLPREDNISOLONE 4 MG/1
TABLET ORAL
Qty: 21 TABLET | Refills: 0 | Status: SHIPPED | OUTPATIENT
Start: 2025-06-12

## 2025-06-12 RX ORDER — ESCITALOPRAM OXALATE 10 MG/1
10 TABLET ORAL DAILY
Qty: 90 TABLET | Refills: 2 | Status: SHIPPED | OUTPATIENT
Start: 2025-06-12

## 2025-06-12 ASSESSMENT — PATIENT HEALTH QUESTIONNAIRE - PHQ9
SUM OF ALL RESPONSES TO PHQ9 QUESTIONS 1 AND 2: 0
1. LITTLE INTEREST OR PLEASURE IN DOING THINGS: NOT AT ALL
2. FEELING DOWN, DEPRESSED OR HOPELESS: NOT AT ALL

## 2025-06-12 NOTE — PROGRESS NOTES
Subjective   Patient ID: Neptali Gómez is a 30 y.o. male who presents for medication follow-up  Today he is accompanied by alone.     HPI  Neptali is presenting to the office today to follow-up for his anxiety    1. Anxiety  Continues Lexapro 10 mg daily as indicated the chart  Denies high/lows or any HI/SI  Denies any major panic attacks  Wishes to continue the medication at its current dosage as he feels well  Requesting to be sent out to his mail out pharmacy    2. Neck Stiffness  Developed neck stiffness, with decreased range of motion and pain when rotating and sidebending to the left.  This occurred after he fell asleep on the car ride home with his neck in an improper position.          Current Outpatient Medications on File Prior to Visit   Medication Sig Dispense Refill    escitalopram (Lexapro) 10 mg tablet Take 1 tablet (10 mg) by mouth once daily. 90 tablet 1     No current facility-administered medications on file prior to visit.        No Known Allergies    Immunization History   Administered Date(s) Administered    COVID-19, mRNA, LNP-S, PF, 30 mcg/0.3 mL dose 04/05/2021, 01/10/2022    DTaP, Unspecified 1996, 1996, 1996, 09/21/1997, 03/29/2001    Flu vaccine (IIV4), preservative free *Check age/dose* 10/05/2020, 10/29/2021, 11/07/2022, 10/19/2023    Flu vaccine, trivalent, preservative free, age 6 months and greater (Fluarix/Fluzone/Flulaval) 12/13/2024    Hepatitis B vaccine, 19 yrs and under (RECOMBIVAX, ENGERIX) 1996, 1996, 1996    HiB PRP-T conjugate vaccine (HIBERIX, ACTHIB) 1996, 1996, 1996, 05/29/1997    Influenza Whole 10/22/2005, 11/14/2008, 10/12/2009, 11/02/2010    Influenza, seasonal, injectable 11/07/2022    MMR vaccine, subcutaneous (MMR II) 05/29/1997, 03/29/2001    Meningococcal ACWY-D (Menactra) 4-valent conjugate vaccine 11/14/2008, 07/24/2014    OPV 1996, 1996, 09/21/1997, 03/29/2001    Tdap vaccine, age 7 year and  older (BOOSTRIX, ADACEL) 08/09/2007, 09/02/2020    Varicella vaccine, subcutaneous (VARIVAX) 09/26/1997, 08/09/2007         Review of Systems  All pertinent positive symptoms are included in the history of present illness.  All other systems have been reviewed and are negative and noncontributory to this patient's current ailments.     Objective   /78 (BP Location: Left arm, Patient Position: Sitting, BP Cuff Size: Large adult)   Pulse 99   Wt 139 kg (307 lb)   SpO2 97%   BMI 45.34 kg/m²   BSA: 2.6 meters squared  No visits with results within 1 Month(s) from this visit.   Latest known visit with results is:   Office Visit on 12/13/2024   Component Date Value Ref Range Status    POC Color, Urine 12/13/2024 Yellow  Straw, Yellow, Light-Yellow Final    POC Appearance, Urine 12/13/2024 Clear  Clear Final    POC Glucose, Urine 12/13/2024 NEGATIVE  NEGATIVE mg/dl Final    POC Bilirubin, Urine 12/13/2024 NEGATIVE  NEGATIVE Final    POC Ketones, Urine 12/13/2024 NEGATIVE  NEGATIVE mg/dl Final    POC Specific Gravity, Urine 12/13/2024 1.010  1.005 - 1.035 Final    POC Blood, Urine 12/13/2024 NEGATIVE  NEGATIVE Final    POC PH, Urine 12/13/2024 6.0  No Reference Range Established PH Final    POC Protein, Urine 12/13/2024 NEGATIVE  NEGATIVE, 30 (1+) mg/dl Final    POC Urobilinogen, Urine 12/13/2024 0.2  0.2, 1.0 EU/DL Final    Poc Nitrite, Urine 12/13/2024 NEGATIVE  NEGATIVE Final    POC Leukocytes, Urine 12/13/2024 NEGATIVE  NEGATIVE Final    Neisseria gonorrhea,Amplified 12/13/2024 Negative  Negative Final    Chlamydia trachomatis, Amplified 12/13/2024 Negative  Negative Final       Physical Exam  CONSTITUTIONAL - well nourished, well developed, looks like stated age, in no acute distress, not ill-appearing, and not tired appearing  SKIN - normal skin color and pigmentation, normal skin turgor without rash, lesions, or nodules visualized  HEAD - no trauma, normocephalic  EYES - normal external exam  LUNG - clear  to auscultation, no wheezing, no crackles and no rales, good effort  CARDIAC - regular rate and regular rhythm, no skipped beats, no murmur  EXTREMITIES - no edema, no deformities  NEUROLOGICAL - normal balance, normal motor, no ataxia  PSYCHIATRIC - alert, pleasant and cordial, age-appropriate      Assessment/Plan     1. Anxiety  Stable.  No changes recommended this time  Continue Lexapro 10 mg daily as currently prescribed  This was sent out to your mail out pharmacy    2. Neck stiffness  A prescription for Medrol Dosepak has been sent to your pharmacy.  Please take as directed  If this does not improve, please follow-up and we will send a prescription in for muscle laxer.  In addition you may continue to use NSAIDs/Tylenol to help with your pain.       Please contact the office if you have any further questions/concerns  Otherwise, please follow-up in 6 months for continued care as well as your yearly physical examination

## 2025-06-20 ENCOUNTER — APPOINTMENT (OUTPATIENT)
Dept: PRIMARY CARE | Facility: CLINIC | Age: 30
End: 2025-06-20
Payer: COMMERCIAL